# Patient Record
Sex: MALE | Race: WHITE | Employment: FULL TIME | URBAN - METROPOLITAN AREA
[De-identification: names, ages, dates, MRNs, and addresses within clinical notes are randomized per-mention and may not be internally consistent; named-entity substitution may affect disease eponyms.]

---

## 2018-05-31 ENCOUNTER — HOSPITAL ENCOUNTER (EMERGENCY)
Age: 58
Discharge: HOME | End: 2018-05-31
Attending: EMERGENCY MEDICINE
Payer: COMMERCIAL

## 2018-05-31 ENCOUNTER — APPOINTMENT (OUTPATIENT)
Dept: CT IMAGING | Age: 58
End: 2018-05-31
Attending: EMERGENCY MEDICINE
Payer: COMMERCIAL

## 2018-05-31 VITALS
OXYGEN SATURATION: 100 % | SYSTOLIC BLOOD PRESSURE: 140 MMHG | TEMPERATURE: 97.7 F | HEIGHT: 73 IN | HEART RATE: 83 BPM | BODY MASS INDEX: 31.81 KG/M2 | WEIGHT: 240 LBS | RESPIRATION RATE: 20 BRPM | DIASTOLIC BLOOD PRESSURE: 84 MMHG

## 2018-05-31 DIAGNOSIS — S40.022A HEMATOMA OF ARM, LEFT, INITIAL ENCOUNTER: Primary | ICD-10-CM

## 2018-05-31 LAB
ANION GAP SERPL CALC-SCNC: 12 MMOL/L (ref 3–18)
APTT PPP: 25.7 SEC (ref 23–36.4)
BASOPHILS # BLD: 0 K/UL (ref 0–0.06)
BASOPHILS NFR BLD: 0 % (ref 0–2)
BUN SERPL-MCNC: 10 MG/DL (ref 7–18)
BUN/CREAT SERPL: 11 (ref 12–20)
CALCIUM SERPL-MCNC: 9.1 MG/DL (ref 8.5–10.1)
CHLORIDE SERPL-SCNC: 102 MMOL/L (ref 100–108)
CO2 SERPL-SCNC: 24 MMOL/L (ref 21–32)
CREAT SERPL-MCNC: 0.92 MG/DL (ref 0.6–1.3)
DIFFERENTIAL METHOD BLD: NORMAL
EOSINOPHIL # BLD: 0.1 K/UL (ref 0–0.4)
EOSINOPHIL NFR BLD: 1 % (ref 0–5)
ERYTHROCYTE [DISTWIDTH] IN BLOOD BY AUTOMATED COUNT: 13.8 % (ref 11.6–14.5)
GLUCOSE SERPL-MCNC: 91 MG/DL (ref 74–99)
HCT VFR BLD AUTO: 46.3 % (ref 36–48)
HGB BLD-MCNC: 15.7 G/DL (ref 13–16)
INR PPP: 0.9 (ref 0.8–1.2)
LYMPHOCYTES # BLD: 1.9 K/UL (ref 0.9–3.6)
LYMPHOCYTES NFR BLD: 21 % (ref 21–52)
MCH RBC QN AUTO: 32.2 PG (ref 24–34)
MCHC RBC AUTO-ENTMCNC: 33.9 G/DL (ref 31–37)
MCV RBC AUTO: 95.1 FL (ref 74–97)
MONOCYTES # BLD: 0.7 K/UL (ref 0.05–1.2)
MONOCYTES NFR BLD: 8 % (ref 3–10)
NEUTS SEG # BLD: 6.2 K/UL (ref 1.8–8)
NEUTS SEG NFR BLD: 70 % (ref 40–73)
PLATELET # BLD AUTO: 265 K/UL (ref 135–420)
PMV BLD AUTO: 11.1 FL (ref 9.2–11.8)
POTASSIUM SERPL-SCNC: 3.9 MMOL/L (ref 3.5–5.5)
PROTHROMBIN TIME: 12 SEC (ref 11.5–15.2)
RBC # BLD AUTO: 4.87 M/UL (ref 4.7–5.5)
SODIUM SERPL-SCNC: 138 MMOL/L (ref 136–145)
WBC # BLD AUTO: 8.9 K/UL (ref 4.6–13.2)

## 2018-05-31 PROCEDURE — 74011636320 HC RX REV CODE- 636/320: Performed by: EMERGENCY MEDICINE

## 2018-05-31 PROCEDURE — 80048 BASIC METABOLIC PNL TOTAL CA: CPT | Performed by: EMERGENCY MEDICINE

## 2018-05-31 PROCEDURE — 99282 EMERGENCY DEPT VISIT SF MDM: CPT

## 2018-05-31 PROCEDURE — 85730 THROMBOPLASTIN TIME PARTIAL: CPT | Performed by: EMERGENCY MEDICINE

## 2018-05-31 PROCEDURE — 85610 PROTHROMBIN TIME: CPT | Performed by: EMERGENCY MEDICINE

## 2018-05-31 PROCEDURE — 85025 COMPLETE CBC W/AUTO DIFF WBC: CPT | Performed by: EMERGENCY MEDICINE

## 2018-05-31 PROCEDURE — 73206 CT ANGIO UPR EXTRM W/O&W/DYE: CPT

## 2018-05-31 RX ORDER — CEPHALEXIN 500 MG/1
500 CAPSULE ORAL 4 TIMES DAILY
Qty: 28 CAP | Refills: 0 | Status: SHIPPED | OUTPATIENT
Start: 2018-05-31 | End: 2018-06-07

## 2018-05-31 RX ORDER — TRAMADOL HYDROCHLORIDE 50 MG/1
50 TABLET ORAL
Qty: 12 TAB | Refills: 0 | Status: SHIPPED | OUTPATIENT
Start: 2018-05-31 | End: 2018-06-07 | Stop reason: SDUPTHER

## 2018-05-31 RX ADMIN — IOPAMIDOL 100 ML: 755 INJECTION, SOLUTION INTRAVENOUS at 14:25

## 2018-05-31 NOTE — ED PROVIDER NOTES
EMERGENCY DEPARTMENT HISTORY AND PHYSICAL EXAM    Date: 5/31/2018  Patient Name: Mariah Vizcarra    History of Presenting Illness     Chief Complaint   Patient presents with    Laceration         History Provided By: Patient    Chief Complaint: left arm pain  Duration: yesterday   Timing:  Acute  Location: left forearm  Severity: 2 out of 10  Associated Symptoms: wound    Additional History (Context):   12:45 PM  Mariah Vizcarra is a 62 y.o. male who presents to the emergency department C/O 2/10 left arm pain with laceration wound onset yesterday after iron work injury which was stitched (15 stitches on forearm) and this morning reopened. Pt states he was seen at Urgent Care yesterday for an arm laceration and had it sutured. Pt explains that this morning it was swollen with blood seeping from sutures. Pt went back to Urgent Care and was sent to this facility after wound was re-wrapped. NKDA. PSHx of appendectomy. Tetanus UTD yesterday. Pt is a cigarette smoker and an EtOH user. Pt denies hx of blood clots or blood thinner use and any other sxs or complaints. PCP: Kimberly Mccudry MD        Past History     Past Medical History:  History reviewed. No pertinent past medical history. Past Surgical History:  Past Surgical History:   Procedure Laterality Date    HX APPENDECTOMY         Family History:  History reviewed. No pertinent family history. Social History:  Social History   Substance Use Topics    Smoking status: Current Some Day Smoker    Smokeless tobacco: Current User    Alcohol use Yes       Allergies:  No Known Allergies      Review of Systems   Review of Systems   Musculoskeletal:        (+) Left arm pain   Skin: Positive for wound. All other systems reviewed and are negative.       Physical Exam     Vitals:    05/31/18 1211   BP: 140/84   Pulse: 83   Resp: 20   Temp: 97.7 °F (36.5 °C)   SpO2: 100%   Weight: 108.9 kg (240 lb)   Height: 6' 1\" (1.854 m)     Physical Exam   Nursing note and vitals reviewed. Vital signs and nursing notes reviewed    CONSTITUTIONAL: Alert, in no apparent distress; well-developed; well-nourished. HEAD:  Normocephalic, atraumatic  EYES: PERRL; EOM's intact. ENTM: Nose: no rhinorrhea; Throat: no erythema or exudate, mucous membranes moist  Neck:  No JVD, supple without lymphadenopathy  RESP: Chest clear, equal breath sounds. CV: S1 and S2 WNL; No murmurs, gallops or rubs. GI: Normal bowel sounds, abdomen soft and non-tender. No masses or organomegaly. : No costo-vertebral angle tenderness. BACK:  Non-tender  UPPER EXT:  Left forearm has a 10 cm volar laceration with non pulseless and non fluctuant mass underlying it. Radial pulse of pulse 2/4 in left hand. Ulnar pulse diminished, but present by doppler. LOWER EXT: No edema, no calf tenderness. Distal pulses intact. NEURO: CN intact, reflexes 2/4 and sym, strength 5/5 and sym, sensation intact. SKIN: No rashes; Normal for age and stage. PSYCH:  Alert and oriented, normal affect. Diagnostic Study Results     Labs -     Recent Results (from the past 12 hour(s))   CBC WITH AUTOMATED DIFF    Collection Time: 05/31/18  1:00 PM   Result Value Ref Range    WBC 8.9 4.6 - 13.2 K/uL    RBC 4.87 4.70 - 5.50 M/uL    HGB 15.7 13.0 - 16.0 g/dL    HCT 46.3 36.0 - 48.0 %    MCV 95.1 74.0 - 97.0 FL    MCH 32.2 24.0 - 34.0 PG    MCHC 33.9 31.0 - 37.0 g/dL    RDW 13.8 11.6 - 14.5 %    PLATELET 125 426 - 051 K/uL    MPV 11.1 9.2 - 11.8 FL    NEUTROPHILS 70 40 - 73 %    LYMPHOCYTES 21 21 - 52 %    MONOCYTES 8 3 - 10 %    EOSINOPHILS 1 0 - 5 %    BASOPHILS 0 0 - 2 %    ABS. NEUTROPHILS 6.2 1.8 - 8.0 K/UL    ABS. LYMPHOCYTES 1.9 0.9 - 3.6 K/UL    ABS. MONOCYTES 0.7 0.05 - 1.2 K/UL    ABS. EOSINOPHILS 0.1 0.0 - 0.4 K/UL    ABS.  BASOPHILS 0.0 0.0 - 0.06 K/UL    DF AUTOMATED     METABOLIC PANEL, BASIC    Collection Time: 05/31/18  1:00 PM   Result Value Ref Range    Sodium 138 136 - 145 mmol/L    Potassium 3.9 3.5 - 5.5 mmol/L    Chloride 102 100 - 108 mmol/L    CO2 24 21 - 32 mmol/L    Anion gap 12 3.0 - 18 mmol/L    Glucose 91 74 - 99 mg/dL    BUN 10 7.0 - 18 MG/DL    Creatinine 0.92 0.6 - 1.3 MG/DL    BUN/Creatinine ratio 11 (L) 12 - 20      GFR est AA >60 >60 ml/min/1.73m2    GFR est non-AA >60 >60 ml/min/1.73m2    Calcium 9.1 8.5 - 10.1 MG/DL   PROTHROMBIN TIME + INR    Collection Time: 05/31/18  1:00 PM   Result Value Ref Range    Prothrombin time 12.0 11.5 - 15.2 sec    INR 0.9 0.8 - 1.2     PTT    Collection Time: 05/31/18  1:00 PM   Result Value Ref Range    aPTT 25.7 23.0 - 36.4 SEC       Radiologic Studies -       CT Results  (Last 48 hours)               05/31/18 1449  CTA UP EXT LT W CONT Final result    Impression:  IMPRESSION:   --------------       1. Normal, intact radial and ulnar arteries with no active arterial contrast   extravasation to suggest significant arterial injury. 2. On delayed imaging there is very subtle contrast blush suggesting injury to   small adjacent superficial vein. This does not involve any of the larger   basilic, cephalic, or median veins of the left forearm. Moderate-sized volar   left forearm soft tissue hematoma partially extends to involve the underlying   extensor carpi radialis longus muscle. Narrative:  CTA of the left upper extremity, with and without IV contrast       CLINICAL HISTORY: Laceration to left forearm at work castrate with worsening   left arm pain and swelling         COMPARISON: None. TECHNIQUE: Thin section CT through the left forearm from the level of the left   elbow distally through the left wrist was obtained using soft tissue and bone   algorithms, during systemic arterial enhancement and delayed venous phase   enhancement. Thin section coronal and sagittal MIP reconstructions were   generated to increase sensitivity for vascular injury.        One or more dose reduction techniques were used on this CT: automated exposure   control, adjustment of the mAs and/or kVp according to patient's size, and   iterative reconstruction techniques. The specific techniques utilized on this CT   exam have been documented in the patient's electronic medical record. ---  FINDINGS  ---       Probably within the superficial soft tissues of the left forearm at the level of   the mid diaphysis there is a relatively well-circumscribed ovoid lesion that   demonstrates heterogeneous density, slightly increased relative to the adjacent   background musculature. Hyperdense and heterogeneous component suggest that this   represents a large soft tissue and partially intramuscular hematoma. It measures   approximately 3.0 cm anteroposterior by 3.9 cm in transverse width and extends   approximately 5.5 cm in overall cephalocaudad length. During early arterial   phase enhancement there is no associated contrast extravasation. The adjacent   radial and ulnar arteries are well visualized and clearly intact. Superficial   arteries are also unremarkable with no active contrast extravasation during   arterial phase imaging. During delayed venous imaging there is a very subtle amount of venous   phase-contrast along the superior/proximal aspect of the hematoma (series 701B,   image 25). This correlates to a very small superficial vein on axial series 7,   images 284-290. Hematoma appears to partially extend into the volar margin of the underlying   extensor carpi radialis longus muscle belly. Intramuscular arteries and veins   appear intact on both arterial phase and delayed imaging. The adjacent flexor   carpi radialis, palmaris longus, and flexor carpi ulnaris muscles are normal in   appearance.        --------------         As read by the radiologist.    CXR Results  (Last 48 hours)    None          Medications given in the ED-  Medications   iopamidol (ISOVUE-370) 76 % injection 100 mL (100 mL IntraVENous Given 5/31/18 3297)         Medical Decision Making   I am the first provider for this patient. I reviewed the vital signs, available nursing notes, past medical history, past surgical history, family history and social history. Vital Signs-Reviewed the patient's vital signs. Pulse Oximetry Analysis - 100% on room air. Records Reviewed: Nursing Notes    Provider Notes (Medical Decision Making): DDx: arterial bleed, venous bleed, hematoma, wound bleeding    Procedures:  Procedures    ED Course:   12:45 PM Initial assessment performed. The patients presenting problems have been discussed, and they are in agreement with the care plan formulated and outlined with them. I have encouraged them to ask questions as they arise throughout their visit.    4:03 PM Discussed patient's history, exam, and available diagnostics results with Ara Libman, MD (vascular surgery), who agree with pressure dressings, covering pt with Keflex abx (as pt was given Amoxicillin at another facility that he hasn't started), and pt is to f/u with Dr. Arcelia Grissom or with him if pt unable to f/u with PCP. Diagnosis and Disposition       DISCHARGE NOTE:  4:05 PM  Jovan Garcia  results have been reviewed with him. He has been counseled regarding his diagnosis, treatment, and plan. He verbally conveys understanding and agreement of the signs, symptoms, diagnosis, treatment and prognosis and additionally agrees to follow up as discussed. He also agrees with the care-plan and conveys that all of his questions have been answered. I have also provided discharge instructions for him that include: educational information regarding their diagnosis and treatment, and list of reasons why they would want to return to the ED prior to their follow-up appointment, should his condition change. He has been provided with education for proper emergency department utilization. CLINICAL IMPRESSION:    1. Hematoma of arm, left, initial encounter        PLAN:  1. D/C Home  2.    Current Discharge Medication List      START taking these medications    Details   cephALEXin (KEFLEX) 500 mg capsule Take 1 Cap by mouth four (4) times daily for 7 days. Qty: 28 Cap, Refills: 0      traMADol (ULTRAM) 50 mg tablet Take 1 Tab by mouth every six (6) hours as needed for Pain. Max Daily Amount: 200 mg. Qty: 12 Tab, Refills: 0    Associated Diagnoses: Hematoma of arm, left, initial encounter           3. Follow-up Information     Follow up With Details Comments 24 Lupe Coto MD Schedule an appointment as soon as possible for a visit For Primary Care [facility] [address] [id number] [address] [place]      Dez Arriaga MD Schedule an appointment as soon as possible for a visit For Vascular Surgery Follow Up [facility]  [address] [address]      [facility] EMERGENCY DEPT Go to If symptoms worsen, As needed [address] [id number]  [phone]        _______________________________    Attestations: This note is prepared by Tommy Dong, acting as Scribe for Linda Hernandez MD.    Linda Hernandez MD:  The scribe's documentation has been prepared under my direction and personally reviewed by me in its entirety.   I confirm that the note above accurately reflects all work, treatment, procedures, and medical decision making performed by me.  _______________________________

## 2018-05-31 NOTE — ED TRIAGE NOTES
Patient states that he was seen at Urgent Care yesterday for an arm laceration. Patient states that this am the left arm was swollen and blood was seeping from suture line.

## 2018-05-31 NOTE — ED NOTES
I have reviewed discharge instructions with the patient. The patient verbalized understanding. One prescription given to patient. One prescription sent to pharmacy which was reviewed with patient. Patient armband removed and given to patient to take home. Patient was informed of the privacy risks if armband lost or stolen.

## 2018-05-31 NOTE — ED NOTES
Assumed care of patient. Patient sent by [facility] for complaints of swelling and bleeding from laceration to [facility]. Patient states he was cut yesterday while at work and had x15 stitches placed at [facility] at that time. Patient states that when he awoke, he noticed swelling and bleeding from site. Patient states he presented back to [facility] this morning who sent him here for further evaluation. Patient reports minimal pain at this time. Patient has no further complaints. Dressing in place to E at this time. PMS intact to E. Patient awaiting CT scan at this time. Call bell within reach. Will continue to monitor and assess.

## 2018-05-31 NOTE — ED NOTES
Rounded on patient. Patient made aware that we are awaiting consult from vascular and verbalized understanding. Patient denies any complaints or requests at this time. Will continue to monitor and assess.

## 2018-05-31 NOTE — LETTER
[facility] 
THE [facility] EMERGENCY DEPT 
[address] [address] 
[phone] Work/School Note Date: 5/31/2018 To Whom It May concern: 
 
Viraj Moon was seen and treated today in the emergency room by the following provider(s): 
Attending Provider: Kimo Austin MD. Viraj Moon may return to work on 6/11/2018.  
 
Sincerely, 
 
 
 
 
Aliya Butler MD

## 2018-06-07 ENCOUNTER — OFFICE VISIT (OUTPATIENT)
Dept: VASCULAR SURGERY | Age: 58
End: 2018-06-07

## 2018-06-07 VITALS
WEIGHT: 240 LBS | HEIGHT: 73 IN | DIASTOLIC BLOOD PRESSURE: 68 MMHG | BODY MASS INDEX: 31.81 KG/M2 | RESPIRATION RATE: 18 BRPM | HEART RATE: 76 BPM | SYSTOLIC BLOOD PRESSURE: 132 MMHG

## 2018-06-07 DIAGNOSIS — S41.112A LACERATION OF LEFT UPPER ARM, INITIAL ENCOUNTER: Primary | ICD-10-CM

## 2018-06-07 DIAGNOSIS — S40.022A HEMATOMA OF ARM, LEFT, INITIAL ENCOUNTER: ICD-10-CM

## 2018-06-07 RX ORDER — TRAMADOL HYDROCHLORIDE 50 MG/1
50 TABLET ORAL
Qty: 60 TAB | Refills: 0 | Status: SHIPPED | OUTPATIENT
Start: 2018-06-07

## 2018-06-07 NOTE — LETTER
NOTIFICATION RETURN TO WORK / SCHOOL 
 
6/7/2018 1:40 PM 
 
Mr. Boubacar Stout 
[address] [address] To Whom It May Concern: 
 
Boubacar Stout is currently under the care of  [facility]. He will be out of work starting on 06/25/18. He will return to work/school on: 06/26/18 If there are questions or concerns please have the patient contact our office.  
 
 
 
Sincerely, 
 
 
Dolores Nicole MD

## 2018-06-08 ENCOUNTER — TELEPHONE (OUTPATIENT)
Dept: VASCULAR SURGERY | Age: 58
End: 2018-06-08

## 2018-06-08 NOTE — PROGRESS NOTES
Елена Wilson    Chief Complaint   Patient presents with    New Patient     laceration with venous bleed       History and Physical    Mr. Horace Jimenez is a 62year old gentleman referred to our office for evaluation of his left forearm laceration from the ER. Mr. 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  He presented to an urgent care facility where his wound was irrigated out, he received a Tetanus shot and was had his laceration sutured closed. The following day he developed swelling and pain in his mid forearm and presented to [facility] ER. He was seen and diagnosed with a hematoma and a CTA was obtained which showed a venous bleed. A pressure dressing was applied, he was placed in an ACE wrap and placed on antibiotics. He states that his pain has improved as well as his swelling. He denies sensory or motor changes in his left hand. History reviewed. No pertinent past medical history. Patient Active Problem List   Diagnosis Code    Laceration of left upper arm S41.112A     Past Surgical History:   Procedure Laterality Date    HX APPENDECTOMY       Current Outpatient Prescriptions   Medication Sig Dispense Refill    traMADol (ULTRAM) 50 mg tablet Take 1 Tab by mouth every six (6) hours as needed for Pain. Max Daily Amount: 200 mg. 60 Tab 0     No Known Allergies  Social History     Social History    Marital status:      Spouse name: N/A    Number of children: N/A    Years of education: N/A     Occupational History    Not on file.      Social History Main Topics    Smoking status: Current Some Day Smoker    Smokeless tobacco: Never Used    Alcohol use Yes    Drug use: No    Sexual activity: Not on file     Other Topics Concern    Not on file     Social History Narrative      Family History   Problem Relation Age of Onset    Heart Disease Father     Cancer Sister        Review of Systems    Review of Systems   Constitutional: Negative for chills, diaphoresis, fever, malaise/fatigue and weight loss. HENT: Negative for hearing loss and sore throat. Eyes: Negative for blurred vision, photophobia and redness. Respiratory: Negative for cough, hemoptysis, shortness of breath and wheezing. Cardiovascular: Negative for chest pain, palpitations and orthopnea. Gastrointestinal: Negative for abdominal pain, blood in stool, constipation, diarrhea, heartburn, nausea and vomiting. Genitourinary: Negative for dysuria, frequency, hematuria and urgency. Musculoskeletal: Negative for back pain and myalgias. Skin: Negative for itching and rash. Neurological: Negative for dizziness, speech change, focal weakness, weakness and headaches. Endo/Heme/Allergies: Does not bruise/bleed easily. Psychiatric/Behavioral: Negative for depression and suicidal ideas. Physical Exam:    Visit Vitals    /68 (BP 1 Location: Right arm, BP Patient Position: Sitting)    Pulse 76    Resp 18    Ht 6' 1\" (1.854 m)    Wt 240 lb (108.9 kg)    BMI 31.66 kg/m2      Physical Examination: General appearance - alert, well appearing, and in no distress  Mental status - alert, oriented to person, place, and time  Eyes - sclera anicteric, left eye normal, right eye normal  Ears - right ear normal, left ear normal  Nose - normal and patent, no erythema, discharge or polyps  Mouth - mucous membranes moist, pharynx normal without lesions  Neck - supple, no significant adenopathy  Lymphatics - no palpable lymphadenopathy  Chest - clear to auscultation, no wheezes, rales or rhonchi, symmetric air entry  Heart - normal rate and regular rhythm  Abdomen - soft, nontender, nondistended, no masses or organomegaly  Extremities - left forearm laceration with minimal drainage. No obvious signs of infection. Repair intact      Impression and Plan:    ICD-10-CM ICD-9-CM    1. Laceration of left upper arm, initial encounter S41.112A 880.03    2.  Hematoma of arm, left, initial encounter S40.022A 923.9 traMADol (ULTRAM) 50 mg tablet     Orders Placed This Encounter    traMADol (ULTRAM) 50 mg tablet       I told Mr. Jazmin Savage that his wound looks good. I will see him again next week to remove his sutures and have asked him to stay out of work until his wound heals to prevent infection and dehiscence. Mr. Jazmin Savage agrees to this plan. The treatment plan was reviewed with the patient in detail. The patient voiced understanding of this plan and all questions and concerns were addressed. The patient agrees with this plan. We discussed the signs and symptoms that would require earlier attention or intervention. The patient was given educational material related to his/her visit and the patient has voiced understanding of the material.     I appreciate the opportunity to participate in the care of your patient. I will be sure to keep you informed of any subsequent changes in the treatment plan. If you have any questions or concerns, please feel free to contact me. Ekta Ervin MD    PLEASE NOTE:  This document has been produced using voice recognition software. Unrecognized errors in transcription may be present.

## 2018-06-08 NOTE — TELEPHONE ENCOUNTER
Received call from wife and states that there is a problem with script they got could I call pharmacy, called spoke with pharmacist and per insurance they can only fill one week's worth. Called the wife back advise about the above and or she can opt for the full script but would need auth for that and that can take up to 3-4 days to process. She will take the weeks worth and then deal with the next script at a later date if it is needed.

## 2018-06-14 ENCOUNTER — TELEPHONE (OUTPATIENT)
Dept: VASCULAR SURGERY | Age: 58
End: 2018-06-14

## 2018-06-14 NOTE — LETTER
NOTIFICATION OF RETURN TO WORK / SCHOOL 
 
6/14/2018 4:07 PM 
 
Mr. Giovanny Reaves 
[address] [address] [place] To Whom It May Concern: 
 
Giovanny Reaves was under the care of  [facility] from 06/07/2018-06/15/18. He will be able to return to work/school on 06/18/18 on light duty , no lifting of more than 20lbs, and no climbing of ladders for at least 2 weeks from return to work date. If there are questions or concerns please have the patient contact our office.  
 
Sincerely, 
 
 
Anne Marie Kam MD

## 2018-06-14 NOTE — TELEPHONE ENCOUNTER
Wife called to see when patient could come and get sutures removed and advised to come tomorrow and will give patient work note to go back with light duty.